# Patient Record
Sex: FEMALE | Race: WHITE | NOT HISPANIC OR LATINO | Employment: FULL TIME
[De-identification: names, ages, dates, MRNs, and addresses within clinical notes are randomized per-mention and may not be internally consistent; named-entity substitution may affect disease eponyms.]

---

## 2017-05-26 ENCOUNTER — HEALTH MAINTENANCE LETTER (OUTPATIENT)
Age: 42
End: 2017-05-26

## 2018-06-02 ENCOUNTER — HEALTH MAINTENANCE LETTER (OUTPATIENT)
Age: 43
End: 2018-06-02

## 2019-09-28 ENCOUNTER — HEALTH MAINTENANCE LETTER (OUTPATIENT)
Age: 44
End: 2019-09-28

## 2021-01-10 ENCOUNTER — HEALTH MAINTENANCE LETTER (OUTPATIENT)
Age: 46
End: 2021-01-10

## 2021-03-13 ENCOUNTER — HEALTH MAINTENANCE LETTER (OUTPATIENT)
Age: 46
End: 2021-03-13

## 2021-10-23 ENCOUNTER — HEALTH MAINTENANCE LETTER (OUTPATIENT)
Age: 46
End: 2021-10-23

## 2022-02-12 ENCOUNTER — HEALTH MAINTENANCE LETTER (OUTPATIENT)
Age: 47
End: 2022-02-12

## 2022-04-09 ENCOUNTER — HEALTH MAINTENANCE LETTER (OUTPATIENT)
Age: 47
End: 2022-04-09

## 2022-10-09 ENCOUNTER — HEALTH MAINTENANCE LETTER (OUTPATIENT)
Age: 47
End: 2022-10-09

## 2023-02-18 ENCOUNTER — HEALTH MAINTENANCE LETTER (OUTPATIENT)
Age: 48
End: 2023-02-18

## 2023-05-21 ENCOUNTER — HEALTH MAINTENANCE LETTER (OUTPATIENT)
Age: 48
End: 2023-05-21

## 2023-08-07 ENCOUNTER — HOSPITAL ENCOUNTER (EMERGENCY)
Facility: HOSPITAL | Age: 48
Discharge: HOME | End: 2023-08-07
Admitting: PHYSICIAN ASSISTANT
Payer: COMMERCIAL

## 2023-08-07 ENCOUNTER — APPOINTMENT (OUTPATIENT)
Dept: RADIOLOGY | Facility: HOSPITAL | Age: 48
End: 2023-08-07
Attending: STUDENT IN AN ORGANIZED HEALTH CARE EDUCATION/TRAINING PROGRAM
Payer: COMMERCIAL

## 2023-08-07 VITALS
HEART RATE: 115 BPM | WEIGHT: 134 LBS | BODY MASS INDEX: 22.88 KG/M2 | SYSTOLIC BLOOD PRESSURE: 137 MMHG | TEMPERATURE: 98.9 F | RESPIRATION RATE: 16 BRPM | DIASTOLIC BLOOD PRESSURE: 81 MMHG | OXYGEN SATURATION: 100 % | HEIGHT: 64 IN
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
## 2023-08-07 DIAGNOSIS — S92.009A CALCANEAL FRACTURE: ICD-10-CM

## 2023-08-07 DIAGNOSIS — M25.571 PAIN IN JOINT, ANKLE AND FOOT, RIGHT: ICD-10-CM

## 2023-08-07 PROCEDURE — 73600 X-RAY EXAM OF ANKLE: CPT | Mod: RT

## 2023-08-07 PROCEDURE — 250N000013 HC RX MED GY IP 250 OP 250 PS 637: Performed by: STUDENT IN AN ORGANIZED HEALTH CARE EDUCATION/TRAINING PROGRAM

## 2023-08-07 PROCEDURE — 28400 CLTX CALCANEAL FX W/O MNPJ: CPT | Mod: RT

## 2023-08-07 PROCEDURE — 99284 EMERGENCY DEPT VISIT MOD MDM: CPT | Mod: 25

## 2023-08-07 PROCEDURE — 73630 X-RAY EXAM OF FOOT: CPT | Mod: RT

## 2023-08-07 RX ORDER — IBUPROFEN 600 MG/1
600 TABLET, FILM COATED ORAL ONCE
Status: COMPLETED | OUTPATIENT
Start: 2023-08-07 | End: 2023-08-07

## 2023-08-07 RX ADMIN — IBUPROFEN 600 MG: 600 TABLET, FILM COATED ORAL at 17:10

## 2023-08-07 NOTE — DISCHARGE INSTRUCTIONS
You were seen here in the emergency department for evaluation of fall, right ankle injury.  Will send home with some crutches.  As we discussed, your calcaneus has some small bone fragments around it.  Unclear if this is acute, or chronic.  Given that you are having some pain in that area, we did put you in a splint, have you follow-up with orthopedics.  Recommended ibuprofen or Tylenol at home.    Call the number for [facility]    For pain or fever you may use:  -Tylenol 650 mg every 6 hours.  Max 4000 mg in 24 hours  Do not use thismedication with alcohol as it can cause liver problems.  -Ibuprofen 600 mg every 6 hours.  Max 3500 mg in 24 hours  Do not take this medication if you have a history of a GI bleed or have kidney problems.  You may use both of these medications at the same time or you can alternate them every 3 hours.  For example, Tylenol at 6 AM, ibuprofen at 9 AM, Tylenol at noon, etc.

## 2023-08-07 NOTE — ED PROVIDER NOTES
EMERGENCY DEPARTMENT ENCOUNTER      NAME: Denise Morales  AGE: 47 year old female  YOB: 1975  MRN: [id number]  EVALUATION DATE & TIME: 8/7/2023  5:35 PM    PCP: No Ref-Primary, Physician    ED PROVIDER: Eliezer Velasquez PA-C      Chief Complaint   Patient presents with     Fall         FINAL IMPRESSION:  1. Fall, initial encounter    2. Pain in joint, ankle and foot, right    3. Calcaneal fracture          ED COURSE & MEDICAL DECISION MAKING:    Pertinent Labs & Imaging studies reviewed. (See chart for details)  5:39 PM I met the patient and performed my initial interview and exam.   5:53 PM Splint in place, plan for discharge.     47 year old female presents to the Emergency Department for evaluation of right ankle pain.    ED Course as of 08/07/23 1807   Mon Aug 07, 2023   1727 XR Ankle Right 2 Views  X-ray of the right foot here shows age-indeterminate avulsive fracture of the anterior calcaneus.  Multiple tiny fracture fragments in this region.  Correlation with point tenderness recommended.  ORIF healed tibia medial malleolus with screw fixation, hardware intact.  Mild soft tissue joint swelling.   1755 Patient seen and reevaluated here, after x-rays.  She does have tenderness over the anterior portion of the calcaneus on the right side.  She had has no other tenderness across the lower extremity.  She is able to move her toes, pulses are intact.  No significant deformity.  Patient inverted the ankle.  She has a previous fracture, with hardware, this was many years ago.  Hardware appears intact here on x-rays.  I did independently review the x-rays here, and do appreciate some of the bony fragments that are present in there.  I will have her follow-up with orthopedics as it is difficult to ascertain with the tenderness if this is just a sprain, or if the avulsion fracture in the right side is new.  She was placed in a posterior splint with a stirrup, and will have her follow-up with  orthopedics.  She is agreeable with this plan.  Recommend ibuprofen or Tylenol at home for pain, she is agreeable with this.  Given number for [facility].  Remainder of examination here is unremarkable, she did not lose consciousness, not on any blood thinners, did not lose consciousness, or hit her head.  Plan for discharge.       Medical Decision Making    History:  Supplemental history from: Documented in chart, if applicable  External Record(s) reviewed: Documented in chart, if applicable.    Work Up:  Chart documentation includes differential considered and any EKGs or imaging independently interpreted by provider, where specified.  In additional to work up documented, I considered the following work up: Documented in chart, if applicable.    External consultation:  Discussion of management with another provider: Documented in chart, if applicable    Complicating factors:  Care impacted by chronic illness: N/A  Care affected by social determinants of health: N/A    Disposition considerations: Discharge. No recommendations on prescription strength medication(s). N/A.       At the conclusion of the encounter I discussed the results of all of the tests and the disposition. The questions were answered. The patient or family acknowledged understanding and was agreeable with the care plan.       0 minutes of critical care time     MEDICATIONS GIVEN IN THE EMERGENCY:  Medications   ibuprofen (ADVIL/MOTRIN) tablet 600 mg (600 mg Oral $Given 8/7/23 2380)       NEW PRESCRIPTIONS STARTED AT TODAY'S ER VISIT  New Prescriptions    No medications on file       =================================================================    HPI    Patient information was obtained from: the patient    Use of : N/A       Denise Morales is a 47 year old female with a pertinent history of cervical intraepithelial neoplasia III and left breast mass who presents to this ED via private vehicle for evaluation of right ankle  "pain.    Patient fell after twisting her right ankle. The patient reports that she heard some \"snaps.\" She has had a right ankle surgery in the past where she had screws put in. Her pain is centralized on her outer ankle and doesn't radiate to any other areas. She has associated right ankle pain and tenderness and reports no other symptoms at this time.    REVIEW OF SYSTEMS   Review of Systems   Musculoskeletal:         Right ankle pain and tenderness   All other systems reviewed and are negative.       PAST MEDICAL HISTORY:  Past Medical History:   Diagnosis Date     Papanicolaou smear of cervix with low grade squamous intraepithelial lesion (LGSIL) 09/08    positive HPV       PAST SURGICAL HISTORY:  Past Surgical History:   Procedure Laterality Date     BREAST BIOPSY, CORE RT/LT Left 08/2016    fibroadenoma     LEEP TX, CERVICAL  10/2008    MÓNICA 1,2, & 3, clear margins     OPEN REDUCTION INTERNAL FIXATION ANKLE  1990    right     CURRENT MEDICATIONS:    Multiple Vitamin (DAILY MULTIVITAMIN PO)       ALLERGIES:  Allergies   Allergen Reactions     Sulfa Antibiotics        FAMILY HISTORY:  Family History   Problem Relation Age of Onset     Gynecology Mother         TVH in mid-40's     Diabetes Mother         type 1     Prostate Cancer Maternal Grandfather      Depression Mother      Depression Father      Substance Abuse Maternal Grandmother      Hyperlipidemia Father         and more on fathers side       SOCIAL HISTORY:   Social History     Socioeconomic History     Marital status:    Tobacco Use     Smoking status: Never   Substance and Sexual Activity     Alcohol use: Yes     Comment: 1x dinner     Drug use: No     Sexual activity: Yes     Partners: Male     Birth control/protection: Surgical     Comment:  has had a vasectomy.       VITALS:  /81   Pulse 115   Temp 98.9  F (37.2  C)   Resp 16   Ht 1.626 m (5' 4\")   Wt 60.8 kg (134 lb)   SpO2 100%   BMI 23.00 kg/m      PHYSICAL EXAM  "   Physical Exam  Vitals and nursing note reviewed.   Constitutional:       General: She is not in acute distress.     Appearance: Normal appearance. She is normal weight. She is not toxic-appearing or diaphoretic.   HENT:      Right Ear: External ear normal.      Left Ear: External ear normal.   Eyes:      Conjunctiva/sclera: Conjunctivae normal.   Cardiovascular:      Rate and Rhythm: Normal rate and regular rhythm.      Pulses: Normal pulses.      Heart sounds: No murmur heard.     No friction rub. No gallop.   Pulmonary:      Effort: Pulmonary effort is normal. No respiratory distress.      Breath sounds: No stridor. No wheezing or rales.   Abdominal:      General: Abdomen is flat.      Palpations: Abdomen is soft.   Musculoskeletal:         General: Swelling, tenderness and signs of injury present. No deformity.      Right lower leg: No edema.      Left lower leg: No edema.      Comments: Patient with tenderness present to the right lateral portion of the ankle, over the calcaneus, which is the area of concern on x-ray.  She does have some range of motion.  Tenderness otherwise to the posterior aspect of the heel, however no significant other abnormalities, no significant deformity.  Patient has not tried to bear weight on it.  Remainder of the right lower extremity is nontender.   Skin:     Findings: No erythema or rash.   Neurological:      Mental Status: She is alert. Mental status is at baseline.         LAB:  All pertinent labs reviewed and interpreted.  Labs Ordered and Resulted from Time of ED Arrival to Time of ED Departure - No data to display    RADIOLOGY:  Reviewed all pertinent imaging. Please see official radiology report.  XR Ankle Right 2 Views   Final Result   IMPRESSION:   1.  Age-indeterminate avulsive fracture of the anterior calcaneus. There are multiple tiny bone fragments in this region. Correlation with point tenderness is recommended.   2.  ORIF healed tibia medial malleolus fracture with  screw fixation. The hardware is intact.   3.  Normal joint spacing and alignment.   4.  Mild soft tissue swelling in the lateral ankle.      Foot  XR, G/E 3 views, right   Final Result   IMPRESSION:   1.  Age-indeterminate avulsive fracture of the anterior calcaneus. There are multiple tiny bone fragments in this region. Correlation with point tenderness is recommended.   2.  ORIF healed tibia medial malleolus fracture with screw fixation. The hardware is intact.   3.  Normal joint spacing and alignment.   4.  Mild soft tissue swelling in the lateral ankle.        PROCEDURES:     PROCEDURE: Splint Placement   INDICATIONS: right avulsion f fracture   PROCEDURE PROVIDER: Gus Velasquez PA-C   NOTE:  A Posterior slab (short leg) with Stirrup splint made of Orthoglass was applied to the  right foot  by the above provider. As noted in the physical exam, distal CMS was intact prior to placement. The splint was checked and the fit was adjusted to ensure proper positioning after placement. Sensation and circulation, as well as motor function, are unchanged after splint placement and the patient is more comfortable with the splint in place.     I, Christopher Rodriguez, am serving as a scribe to document services personally performed by Eliezer Velasquez PA-C, based on my observation and the provider's statements to me. I, Eliezer Velasquez PA-C, attest that Christopher Rodriguez is acting in a scribe capacity, has observed my performance of the services and has documented them in accordance with my direction.    Eliezer Velasquez PA-C  Emergency Medicine  [facility] EMERGENCY DEPARTMENT  [facility] [facility] [address]  [phone]  Dept: [phone]       Eliezer Velasquez PA-C  08/07/23 6297

## 2023-08-07 NOTE — ED TRIAGE NOTES
Patient reports that she fell down 1 step about 45 mins ago at her friends house. Patient report that she has pain in her right ankle.      Triage Assessment       Row Name 08/07/23 1990       Triage Assessment (Adult)    Airway WDL WDL       Respiratory WDL    Respiratory WDL WDL       Skin Circulation/Temperature WDL    Skin Circulation/Temperature WDL WDL       Cardiac WDL    Cardiac WDL WDL       Peripheral/Neurovascular WDL    Peripheral Neurovascular WDL WDL       Cognitive/Neuro/Behavioral WDL    Cognitive/Neuro/Behavioral WDL WDL

## 2023-10-20 ENCOUNTER — OFFICE VISIT (OUTPATIENT)
Dept: FAMILY MEDICINE | Facility: CLINIC | Age: 48
End: 2023-10-20
Payer: COMMERCIAL

## 2023-10-20 VITALS
WEIGHT: 146.4 LBS | SYSTOLIC BLOOD PRESSURE: 124 MMHG | RESPIRATION RATE: 24 BRPM | HEIGHT: 61 IN | OXYGEN SATURATION: 100 % | DIASTOLIC BLOOD PRESSURE: 84 MMHG | HEART RATE: 104 BPM | BODY MASS INDEX: 27.64 KG/M2 | TEMPERATURE: 98.2 F

## 2023-10-20 DIAGNOSIS — M54.42 ACUTE LEFT-SIDED LOW BACK PAIN WITH LEFT-SIDED SCIATICA: Primary | ICD-10-CM

## 2023-10-20 PROCEDURE — 99203 OFFICE O/P NEW LOW 30 MIN: CPT | Performed by: PHYSICIAN ASSISTANT

## 2023-10-20 RX ORDER — CYCLOBENZAPRINE HCL 10 MG
5-10 TABLET ORAL 3 TIMES DAILY PRN
Qty: 30 TABLET | Refills: 0 | Status: SHIPPED | OUTPATIENT
Start: 2023-10-20 | End: 2024-01-10

## 2023-10-20 RX ORDER — PREDNISONE 20 MG/1
TABLET ORAL
Qty: 18 TABLET | Refills: 0 | Status: SHIPPED | OUTPATIENT
Start: 2023-10-20 | End: 2023-11-02

## 2023-10-20 ASSESSMENT — ENCOUNTER SYMPTOMS: LEG PAIN: 1

## 2023-10-20 ASSESSMENT — PAIN SCALES - GENERAL: PAINLEVEL: EXTREME PAIN (8)

## 2023-10-20 NOTE — PATIENT INSTRUCTIONS
Stop ibuprofen when taking prednisone    OK to take tylenol    Flexeril (muscle relaxer) up to 3 times daily if needed - caution sedation     Schedule with PT

## 2023-10-20 NOTE — PROGRESS NOTES
Assessment & Plan     Acute left-sided low back pain with left-sided sciatica  Symptoms consistent with sciatica. Will treat with prednisone taper and flexeril and refer to PT. Discussed not taking ibuprofen while on prednisone, OK to take tylenol. Alternate ice and heat, massage, stretching. Avoid sitting or laying for long periods of time. Declines note for work, employer has been accommodating.   - predniSONE (DELTASONE) 20 MG tablet  Dispense: 18 tablet; Refill: 0  - cyclobenzaprine (FLEXERIL) 10 MG tablet  Dispense: 30 tablet; Refill: 0  - Physical Therapy Referral    Follow up if symptoms worsen or fail to improve     AXEL Keb[facility] SHELLEY Walker is a 47 year old, presenting for the following health issues:  Leg Pain (Left leg/)        10/20/2023    12:38 PM   Additional Questions   Roomed by Cammie QUIJANO       History of Present Illness       Back Pain:  She presents for follow up of back pain. Patient's back pain is a new problem.    Original cause of back pain: not sure  First noticed back pain: 1-4 weeks ago  Patient feels back pain: constantlyLocation of back pain:  Left lower back  Description of back pain: burning, cramping, sharp and shooting  Back pain spreads: left thigh    Since patient first noticed back pain, pain is: unchanged  Does back pain interfere with her job:  Yes  On a scale of 1-10 (10 being the worst), patient describes pain as:  8  What makes back pain worse: bending, coughing, certain positions, sitting and twisting   Acupuncture: not tried  Acetaminophen: helpful  Activity or exercise: not tried  Chiropractor:  Not tried  Cold: helpful  Heat: helpful  Massage: not tried  Muscle relaxants: not tried  NSAIDS: helpful  Opioids: not tried  Physical Therapy: not tried  Rest: helpful  Steroid Injection: not tried  Stretching: helpful  Surgery: not tried  TENS unit: not tried  Topical pain relievers: not tried  Other healthcare providers  "patient is seeing for back pain: None    She eats 2-3 servings of fruits and vegetables daily.She consumes 1 sweetened beverage(s) daily.She exercises with enough effort to increase her heart rate 10 to 19 minutes per day.  She exercises with enough effort to increase her heart rate 3 or less days per week.   She is taking medications regularly.     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 Wonders if compensating on left side contributes to current symptoms     Review of Systems   Constitutional, HEENT, cardiovascular, pulmonary, gi and gu systems are negative, except as otherwise noted.      Objective    /84 (BP Location: Left arm, Patient Position: Sitting, Cuff Size: Adult Regular)   Pulse 104   Temp 98.2  F (36.8  C) (Tympanic)   Resp 24   Ht 1.549 m (5' 1\")   Wt 66.4 kg (146 lb 6.4 oz)   LMP 10/15/2023 (Approximate)   SpO2 100%   BMI 27.66 kg/m    Body mass index is 27.66 kg/m .  Physical Exam   GENERAL: healthy, alert and no distress  NEURO: Normal strength and tone, mentation intact and speech normal  Comprehensive back pain exam:  Tenderness of left lower back, lumbar paraspinal region and left sciatic notch , Pain limits the following motions: flexion/extension of lumbar spine, Lower extremity strength functional and equal on both sides,  , and Straight leg positive on  left, indicating possible ipsilateral radiculopathy  PSYCH: mentation appears normal, affect normal/bright          "

## 2023-12-06 ENCOUNTER — THERAPY VISIT (OUTPATIENT)
Dept: PHYSICAL THERAPY | Facility: REHABILITATION | Age: 48
End: 2023-12-06
Attending: PHYSICIAN ASSISTANT
Payer: COMMERCIAL

## 2023-12-06 DIAGNOSIS — M54.42 ACUTE LEFT-SIDED LOW BACK PAIN WITH LEFT-SIDED SCIATICA: ICD-10-CM

## 2023-12-06 DIAGNOSIS — R29.898 LEFT LEG WEAKNESS: Primary | ICD-10-CM

## 2023-12-06 PROCEDURE — 97161 PT EVAL LOW COMPLEX 20 MIN: CPT | Mod: GP | Performed by: PHYSICAL THERAPIST

## 2023-12-06 PROCEDURE — 97110 THERAPEUTIC EXERCISES: CPT | Mod: GP | Performed by: PHYSICAL THERAPIST

## 2023-12-06 NOTE — PROGRESS NOTES
PHYSICAL THERAPY EVALUATION  Type of Visit: Evaluation    See electronic medical record for Abuse and Falls Screening details.    Subjective   Pt is a 47 year-old woman with chief complaint acute low back and left leg pain. She was given prednisone burst and Flexeril - it did help but not alleviate completely. She broke her foot in August and wore a boot for some time. Her back/leg pain came up suddenly without specific onset. At one point the left leg was more numb, now it is only on the inner left lower leg. Sitting is the worst.       Presenting condition or subjective complaint: lower back pain  Date of onset: 10/20/23 (date of order)    Relevant medical history:     Dates & types of surgery: screw placed in ankle approx. 1990    Prior diagnostic imaging/testing results:       Prior therapy history for the same diagnosis, illness or injury: No      Prior Level of Function  Transfers: Independent  Ambulation: Independent  ADL: Independent      Living Environment  Social support: With a significant other or spouse   Type of home: House   Stairs to enter the home: Yes 4 Is there a railing: No   Ramp: No   Stairs inside the home: Yes 30 Is there a railing: Yes   Help at home: None  Equipment owned:       Employment: Yes teacher  Hobbies/Interests: cooking/baking, gardening, exercising    Patient goals for therapy: resume exercising, sit in chairs comfortably, general resuming of daily activities    Pain assessment: Pain present  Location: left low back, can get into anterior left thigh and lateral thigh and into the calf at times /Rating: today: 5/10     Objective   LUMBAR SPINE EVALUATION    INTEGUMENTARY (edema, incisions): WNL  POSTURE: slight left lateral shift in standing, but iliac crests aligned  GAIT:   Weightbearing Status: WBAT  Assistive Device(s): None  Gait Deviations: increased COM lateral displacement    ROM:   (Degrees) Left AROM Left PROM  Right AROM Right PROM   Hip Flexion WNL  WNL    Hip  Extension WNL  WNL    Hip Abduction       Hip Adduction       Hip Internal Rotation WNLWNL  WNL    Hip External Rotation WNL  WNLWNL    Knee Flexion WNL  WNL    Knee Extension WNL  WNL    Lumbar Side glide WNL WNL   Lumbar Flexion Limited by 25% with increased left low back pain   Lumbar Extension Limited by 10% without pain   Lumbar side bending left/right WNL    PELVIC/SI SCREEN: sacral thrust negative  STRENGTH: 30 sec STS: 10 reps, increased left low back pain    B hip ext: 4+/5        MYOTOMES:    Left Right   T12-L3 (Hip Flexion) 3+ 5   L2-4 (Quads)  5 5   L4 (Ankle DF) 5 5   L5 (Great Toe Ext) 5 5   S1 (Toe Raise) 3+ 5       DERMATOMES:    Left Right   T12      L1     L2     L3     L4 Hypo (light touch)    L5     S1         LUMBAR/HIP Special Tests:    Left Right   ALEXIS Negative  Negative    FADIR/Labrum/RADHA     Femoral Nerve     Josie's     Piriformis     Quadrant Testing     SLR Positive Positive   Slump Positive    Stork with Extension     Sudhir Piper's: positive on left side for tightness felt, but not restricted in overall motion      SPINAL SEGMENTAL CONCLUSIONS: WNL, but tender at L3 and L5 with ventral glides      Assessment & Plan   CLINICAL IMPRESSIONS  Medical Diagnosis: Acute left-sided low back pain with left-sided sciatica    Treatment Diagnosis: left leg weakness   Impression/Assessment: Pt is a 47 year-old woman with chief complaint acute low back and left leg pain. She demonstrates symptoms consistent with L LE radiculopathy with positive slump and B SLR, L4 dermatome affected and weakness into L hip flexors and L ankle plantar flexors. She has difficulty with normal activities (e.g., walking the dog) and pain with sitting, though does better with lumbar extension repetitions. She is appropriate for skilled PT to address impairments, instruct in HEP to return to PLOF.     Clinical Decision Making (Complexity):  Clinical Presentation: Stable/Uncomplicated  Clinical Presentation  Rationale: based on medical and personal factors listed in PT evaluation  Clinical Decision Making (Complexity): Low complexity    PLAN OF CARE  Treatment Interventions:  Interventions: Gait Training, Manual Therapy, Neuromuscular Re-education, Therapeutic Activity, Therapeutic Exercise, Self-Care/Home Management    Long Term Goals     PT Goal 1  Goal Identifier: Pain  Goal Description: Pt will report decreased pain from 5/10 to <2/10 to improve quality of life  Target Date: 02/14/24  PT Goal 2  Goal Identifier: Activity level  Goal Description: Pt will report ability to tolerate activities after work (e.g., walking the dogs), rather than sitting d/t pain/fatigue.  Target Date: 02/14/24      Frequency of Treatment: 1x/week to every other week  Duration of Treatment: 3-4 visits        Risks and benefits of evaluation/treatment have been explained.   Patient/Family/caregiver agrees with Plan of Care.     Evaluation Time:     PT Eval, Low Complexity Minutes (93213): 16       Signing Clinician: Radha Ashley PT

## 2023-12-15 ENCOUNTER — THERAPY VISIT (OUTPATIENT)
Dept: PHYSICAL THERAPY | Facility: REHABILITATION | Age: 48
End: 2023-12-15
Attending: PHYSICIAN ASSISTANT
Payer: COMMERCIAL

## 2023-12-15 DIAGNOSIS — R29.898 LEFT LEG WEAKNESS: Primary | ICD-10-CM

## 2023-12-15 PROCEDURE — 97140 MANUAL THERAPY 1/> REGIONS: CPT | Mod: GP | Performed by: PHYSICAL THERAPIST

## 2023-12-15 PROCEDURE — 97110 THERAPEUTIC EXERCISES: CPT | Mod: GP | Performed by: PHYSICAL THERAPIST

## 2023-12-22 ENCOUNTER — THERAPY VISIT (OUTPATIENT)
Dept: PHYSICAL THERAPY | Facility: REHABILITATION | Age: 48
End: 2023-12-22
Attending: PHYSICIAN ASSISTANT
Payer: COMMERCIAL

## 2023-12-22 DIAGNOSIS — R29.898 LEFT LEG WEAKNESS: Primary | ICD-10-CM

## 2023-12-22 PROCEDURE — 97110 THERAPEUTIC EXERCISES: CPT | Mod: GP | Performed by: PHYSICAL THERAPIST

## 2023-12-22 PROCEDURE — 97140 MANUAL THERAPY 1/> REGIONS: CPT | Mod: GP | Performed by: PHYSICAL THERAPIST

## 2023-12-22 NOTE — PROGRESS NOTES
DISCHARGE  Reason for Discharge: Pt progressing on goals, will follow up with referring provider for additional options (see below)    Equipment Issued: N/A    Discharge Plan: Patient to continue home program.    Referring Provider:  Annabelle Almanza       12/22/23 0500   Appointment Info   Signing clinician's name / credentials Radha Ashley, PT   Total/Authorized Visits 3-4   Visits Used 3   Medical Diagnosis Acute left-sided low back pain with left-sided sciatica   PT Tx Diagnosis left leg weakness   Progress Note/Certification   Onset of illness/injury or Date of Surgery 10/20/23  (date of order)   Therapy Frequency 1x/week to every other week   Predicted Duration 3-4 visits   Progress Note Due Date   (visit 4)   GOALS   PT Goals 2   PT Goal 1   Goal Identifier Pain   Goal Description Pt will report decreased pain from 5/10 to <2/10 to improve quality of life   Goal Progress Goal progressing, this week pain was typically 3/10   Target Date 02/14/24   PT Goal 2   Goal Identifier Activity level   Goal Description Pt will report ability to tolerate activities after work (e.g., walking the dogs), rather than sitting d/t pain/fatigue.   Goal Progress goal progressing, she is slowly able to perform more activities.   Target Date 02/14/24   Subjective Report   Subjective Report Pt reports that things were better this week, but she sat for 4 hours in a car today and this made it worse. Currently rating pain 3/10 and at worst was 5/10 while sitting. The numbness is decreased - smaller area and less intense. When sitting for too long will have tingling into the left foot, but this goes away.   Objective Measures   Objective Measures Objective Measure 1   Objective Measure 1   Objective Measure Strength   Details L ankle PF 4/5, left hip flexor: 4-/5   Treatment Interventions (PT)   Interventions Therapeutic Procedure/Exercise;Manual Therapy   Therapeutic Procedure/Exercise   Therapeutic Procedures: strength, endurance,  ROM, flexibillity minutes (66248) 36   Ther Proc 1 Exercises for core/hip and ankle strengthening/ROM   Ther Proc 1 - Details Nustep warm up level 7 x 7 minutes, wall squats with TA x 20, pallof press L3 x 20 each side, trunk rotation with L3 band x 20, progression of TA from marches with brief trial of up/up/down/down (painful), then with single leg extension (and arms overhead) x 20.   Skilled Intervention Pt given verbal, visual and tactile cueing with demonstration to perform correct technique and appropriate alignment for exercises attempted. Provided rationale for exercises performed and educated on the importance of completing them as instructed.   Patient Response/Progress Pain only with up/up/down/down when both legs lifted at same time   Manual Therapy   Manual Therapy: Mobilization, MFR, MLD, friction massage minutes (05880) 10   Manual Therapy 1 - Details Manual therapy to decrease pain   Skilled Intervention L sided axial separation to reduce pain/leg symptoms: pt in R sidelying with left hip/knee flexed and rotation introduced at upper trunk, force applied at greater trochanter.   Patient Response/Progress Less pain at end of session   Plan   Home program See PTRx   Plan for next session N/A   Comments   Comments Pt has been seen for 3 visits from 12/6/23 to present with treatment focused on therapeutic exercise for lumbar ROM (with focus on repeated lumbar ext), core/hip/LE strengthening and manual therapy to decrease pain. She reports 30% improvement overall and less numbness into the left leg. Lumbar extension has remained beneficial and pt has been able to tolerate a progression of core strengthening over time. Encouraged pt to follow up with referring provider for next steps - she may benefit from an MRI and spine or ortho referral for additional options (e.g., injection).   Total Session Time   Timed Code Treatment Minutes 46   Total Treatment Time (sum of timed and untimed services) 46        Radha Ashley, PT, DPT, CLT  12/22/2023

## 2024-01-10 ENCOUNTER — VIRTUAL VISIT (OUTPATIENT)
Dept: FAMILY MEDICINE | Facility: CLINIC | Age: 49
End: 2024-01-10
Payer: COMMERCIAL

## 2024-01-10 DIAGNOSIS — N94.9 ADNEXAL CYST: ICD-10-CM

## 2024-01-10 DIAGNOSIS — G89.29 CHRONIC LEFT-SIDED LOW BACK PAIN WITH LEFT-SIDED SCIATICA: Primary | ICD-10-CM

## 2024-01-10 DIAGNOSIS — M54.42 CHRONIC LEFT-SIDED LOW BACK PAIN WITH LEFT-SIDED SCIATICA: Primary | ICD-10-CM

## 2024-01-10 PROCEDURE — 99213 OFFICE O/P EST LOW 20 MIN: CPT | Mod: 95 | Performed by: PHYSICIAN ASSISTANT

## 2024-01-10 RX ORDER — GABAPENTIN 100 MG/1
100 CAPSULE ORAL 3 TIMES DAILY
Qty: 90 CAPSULE | Refills: 1 | Status: SHIPPED | OUTPATIENT
Start: 2024-01-10 | End: 2024-05-09

## 2024-01-10 RX ORDER — NAPROXEN 500 MG/1
500 TABLET ORAL 2 TIMES DAILY WITH MEALS
Qty: 60 TABLET | Refills: 1 | Status: SHIPPED | OUTPATIENT
Start: 2024-01-10 | End: 2024-05-09

## 2024-01-10 NOTE — PATIENT INSTRUCTIONS
To schedule your imaging (MRI): Call [phone]     Stop advil     Start naproxen 500 mg twice daily with food     You can take tylenol up to 1,000 mg 4 times daily if needed in between naproxen doses    Start gabapentin (for nerve pain), 100 mg 3 times daily if needed - caution sedation

## 2024-01-10 NOTE — PROGRESS NOTES
Denise is a 48 year old who is being evaluated via a billable video visit.      How would you like to obtain your AVS? MyChart  If the video visit is dropped, the invitation should be resent by: Text to cell phone: [phone]  Will anyone else be joining your video visit? No      Assessment & Plan     Chronic left-sided low back pain with left-sided sciatica  Ongoing symptoms for 3+ months with minimal improvement after PT. Discussed getting MRI for further evaluation. Stop ibuprofen, switch to naproxen. OK to use tylenol in between. Trial of gabapentin - caution sedation. Likely refer to ortho once MRI results available.  - MR Lumbar Spine w/o Contrast  - naproxen (NAPROSYN) 500 MG tablet  Dispense: 60 tablet; Refill: 1  - gabapentin (NEURONTIN) 100 MG capsule  Dispense: 90 capsule; Refill: 1    Follow up as needed     Annabelle Almanza PA-C on 1/10/2024 at 5:03 PM    [facility]   Denise is a 48 year old, presenting for the following health issues:  Follow Up      History of Present Illness       Back Pain:  She presents for follow up of back pain. Patient's back pain is a chronic problem.  Location of back pain:  Left lower back  Description of back pain: dull ache, fullness, gnawing and shooting  Back pain spreads: left thigh    Since patient first noticed back pain, pain is: always present, but gets better and worse  Does back pain interfere with her job:  Yes       She eats 4 or more servings of fruits and vegetables daily.She consumes 2 sweetened beverage(s) daily.She exercises with enough effort to increase her heart rate 9 or less minutes per day.  She exercises with enough effort to increase her heart rate 3 or less days per week.      Seen  for symptoms consistent with sciatica of left side  She was treated with prednisone burst and flexeril and referred to PT  She completed 3 sessions of PT with minimal improvement  Numbness and weakness are better  and mobility has improved some  However she continues to have significant issues with pain and radiculopathy   PT has suggested MRI and possible ortho referral for consideration of injections     Review of Systems   Constitutional, HEENT, cardiovascular, pulmonary, gi and gu systems are negative, except as otherwise noted.      Objective       Vitals:  No vitals were obtained today due to virtual visit.    Physical Exam   GENERAL: Healthy, alert and no distress  EYES: Eyes grossly normal to inspection.  No discharge or erythema, or obvious scleral/conjunctival abnormalities.  RESP: No audible wheeze, cough, or visible cyanosis.  No visible retractions or increased work of breathing.    SKIN: Visible skin clear. No significant rash, abnormal pigmentation or lesions.  NEURO: Cranial nerves grossly intact.  Mentation and speech appropriate for age.  PSYCH: Mentation appears normal, affect normal/bright, judgement and insight intact, normal speech and appearance well-groomed.        Video-Visit Details    Type of service:  Video Visit   Video start time: 5:04 PM   Video end time: 5:19 PM      Originating Location (pt. Location): Home  Distant Location (provider location):  On-site  Platform used for Video Visit: Amber

## 2024-01-12 ENCOUNTER — TELEPHONE (OUTPATIENT)
Dept: FAMILY MEDICINE | Facility: CLINIC | Age: 49
End: 2024-01-12
Payer: COMMERCIAL

## 2024-01-12 NOTE — TELEPHONE ENCOUNTER
Patient states insurance needs order for MRI faxed to [facility]. Phone is [phone].  Insurance did not give fax number.   Patient is scheduled for MRI 2/4/24.  Xenia Yoo RN

## 2024-01-12 NOTE — TELEPHONE ENCOUNTER
Called [facility] for fax number to send MRI order to. MRI order faxed to [phone].    Aicha Fair,  [facility]

## 2024-02-04 ENCOUNTER — ANCILLARY PROCEDURE (OUTPATIENT)
Dept: MRI IMAGING | Facility: CLINIC | Age: 49
End: 2024-02-04
Attending: PHYSICIAN ASSISTANT
Payer: COMMERCIAL

## 2024-02-04 DIAGNOSIS — G89.29 CHRONIC LEFT-SIDED LOW BACK PAIN WITH LEFT-SIDED SCIATICA: ICD-10-CM

## 2024-02-04 DIAGNOSIS — M54.42 CHRONIC LEFT-SIDED LOW BACK PAIN WITH LEFT-SIDED SCIATICA: ICD-10-CM

## 2024-02-04 PROCEDURE — 72148 MRI LUMBAR SPINE W/O DYE: CPT | Mod: GC | Performed by: RADIOLOGY

## 2024-02-05 NOTE — RESULT ENCOUNTER NOTE
Denise,    I have reviewed your results below:    - there is a small bulging disc in your low back, it is not causing any narrowing of your spinal canal or where the nerves come out however. How is your back feeling? I can refer you to orthopedics to see if they have further suggestions on management?  - there was an incidental finding of cysts near the right ovary/fallopian tube and some thickening of the endometrial lining - it is recommended to get a pelvic ultrasound to further evaluate this. I have placed an order for this to be done at [facility] - call [phone] to schedule. They will likely do both a transabdominal as well as transvaginal probe to get these images - just heads up.    Please let me know if you have any further questions.    Take care,  Annabelle Almanza PA-C on 2/5/2024 at 8:17 AM

## 2024-02-13 ENCOUNTER — HOSPITAL ENCOUNTER (OUTPATIENT)
Dept: ULTRASOUND IMAGING | Facility: CLINIC | Age: 49
Discharge: HOME | End: 2024-02-13
Attending: PHYSICIAN ASSISTANT | Admitting: PHYSICIAN ASSISTANT
Payer: COMMERCIAL

## 2024-02-13 DIAGNOSIS — N94.9 ADNEXAL CYST: ICD-10-CM

## 2024-02-13 PROCEDURE — 76856 US EXAM PELVIC COMPLETE: CPT

## 2024-02-13 PROCEDURE — 76830 TRANSVAGINAL US NON-OB: CPT

## 2024-02-14 DIAGNOSIS — N83.201 OVARIAN CYST, RIGHT: Primary | ICD-10-CM

## 2024-02-14 DIAGNOSIS — D27.0 TERATOMA OF RIGHT OVARY: ICD-10-CM

## 2024-02-14 NOTE — RESULT ENCOUNTER NOTE
Denise,    Your pelvic ultrasound shows 2 cysts that need further evaluation with an MRI as their appearance is indeterminate based on the ultrasound. You can call [phone] to schedule this. Sorry for all the back and forth!! Please let me know if you have any further questions otherwise I will reach out once I see the results.     Take care,  Annabelle Almanza PA-C on 2/14/2024 at 8:43 AM

## 2024-02-29 ENCOUNTER — HOSPITAL ENCOUNTER (OUTPATIENT)
Dept: MRI IMAGING | Facility: HOSPITAL | Age: 49
Discharge: HOME | End: 2024-02-29
Attending: PHYSICIAN ASSISTANT | Admitting: PHYSICIAN ASSISTANT
Payer: COMMERCIAL

## 2024-02-29 DIAGNOSIS — N83.201 OVARIAN CYST, RIGHT: ICD-10-CM

## 2024-02-29 PROCEDURE — A9585 GADOBUTROL INJECTION: HCPCS | Performed by: PHYSICIAN ASSISTANT

## 2024-02-29 PROCEDURE — 255N000002 HC RX 255 OP 636: Performed by: PHYSICIAN ASSISTANT

## 2024-02-29 PROCEDURE — 72197 MRI PELVIS W/O & W/DYE: CPT

## 2024-02-29 RX ORDER — GADOBUTROL 604.72 MG/ML
0.1 INJECTION INTRAVENOUS ONCE
Status: COMPLETED | OUTPATIENT
Start: 2024-02-29 | End: 2024-02-29

## 2024-02-29 RX ADMIN — GADOBUTROL 6 ML: 604.72 INJECTION INTRAVENOUS at 09:53

## 2024-03-04 NOTE — RESULT ENCOUNTER NOTE
Denise,    I have reviewed your results. MRI shows a dermoid cyst on your right ovary - these cysts are almost always benign but are often recommended to be removed as they can increase risk of ovarian torsion and other complications. It has been recommended that we get a consult with OBGYN for further discussion of management - I have placed this referral for you. You should receive a phone call to schedule, but if you don't hear from a representative within 2 business days, please call [phone].    Please let me know if you have any further questions.    Take care,  Annabelle Almanza PA-C on 3/4/2024 at 7:10 AM

## 2024-03-14 ENCOUNTER — PATIENT OUTREACH (OUTPATIENT)
Dept: FAMILY MEDICINE | Facility: CLINIC | Age: 49
End: 2024-03-14
Payer: COMMERCIAL

## 2024-03-14 NOTE — TELEPHONE ENCOUNTER
Patient Quality Outreach sent through [employer] and letter to current home address.       Candi Story MA on 3/14/2024 at 3:49 PM

## 2024-03-14 NOTE — LETTER
March 14, 2024      Denise Morales  [address]  [place] [address]        Dear Denise,    I care about your health and have reviewed your health plan. I have reviewed your medical conditions, medication list, and lab results and am making recommendations based on this review, to better manage your health.    You are in particular need of attention regarding:  -Cholesterol  -Diabetes  -Breast Cancer Screening  -Colon Cancer Screening  -Cervical Cancer Screening  -Wellness (Physical) Visit   -Immunizations    I am recommending that you:  -schedule a WELLNESS (Physical) APPOINTMENT with me.   I will check fasting labs the same day - nothing to eat except water and meds for 8-10 hours prior.  -schedule a COLONOSCOPY to look for colon cancer (due every 10 years or 5 years in higher risk situations.)   Colon cancer is now the second leading cause of death in the [place] for both men and women and there are over 130,000 new cases and 50,000 deaths per year from colon cancer.  Colonoscopies can prevent 90-95% of these deaths.  Problem lesions can be removed before they ever become cancer.  This test is not only looking for cancer, but also getting rid of precancerious lesions.  If you do not wish to do a colonoscopy or cannot afford to do one, at this time, there is another option. It is called a FIT test or Fecal Immunochemical Occult Blood Test (take home stool sample kit).  It does not replace the colonoscopy for colorectal cancer screening, but it can detect hidden bleeding in the lower colon.  It does need to be repeated every year and if a positive result is obtained, you would be referred for a colonoscopy.  If you have completed either one of these tests at another facility, please have the records sent to our clinic so that we can best coordinate your care.    Here is a list of Health Maintenance topics that are due now or due soon:  Health Maintenance Due   Topic Date Due    ANNUAL REVIEW OF  HM ORDERS  Never done    Discuss Advance Care Planning  Never done    Colorectal Cancer Screening  Never done    HIV Screening  Never done    Hepatitis C Screening  Never done    Hepatitis B Vaccine (1 of 3 - 19+ 3-dose series) Never done    Diptheria Tetanus Pertussis (DTAP/TDAP/TD) Vaccine (6 - Tdap) 09/12/1998    Cholesterol Lab  Never done    Yearly Preventive Visit  09/09/2016    HPV Screening  09/09/2016    PAP Smear  09/09/2016    Mammogram  08/19/2018    Blood Sugar Screening  09/09/2018       Please call us at [phone] (or use [employer]) to address the above recommendations.     Thank you for trusting [facility] and we appreciate the opportunity to serve you.  We look forward to supporting your healthcare needs in the future.    Healthy Regards,    Annabelle Guzman PA-C

## 2024-03-16 ENCOUNTER — HEALTH MAINTENANCE LETTER (OUTPATIENT)
Age: 49
End: 2024-03-16

## 2024-05-08 ASSESSMENT — ANXIETY QUESTIONNAIRES
3. WORRYING TOO MUCH ABOUT DIFFERENT THINGS: NOT AT ALL
8. IF YOU CHECKED OFF ANY PROBLEMS, HOW DIFFICULT HAVE THESE MADE IT FOR YOU TO DO YOUR WORK, TAKE CARE OF THINGS AT HOME, OR GET ALONG WITH OTHER PEOPLE?: NOT DIFFICULT AT ALL
GAD7 TOTAL SCORE: 0
6. BECOMING EASILY ANNOYED OR IRRITABLE: NOT AT ALL
7. FEELING AFRAID AS IF SOMETHING AWFUL MIGHT HAPPEN: NOT AT ALL
1. FEELING NERVOUS, ANXIOUS, OR ON EDGE: NOT AT ALL
7. FEELING AFRAID AS IF SOMETHING AWFUL MIGHT HAPPEN: NOT AT ALL
GAD7 TOTAL SCORE: 0
5. BEING SO RESTLESS THAT IT IS HARD TO SIT STILL: NOT AT ALL
2. NOT BEING ABLE TO STOP OR CONTROL WORRYING: NOT AT ALL
4. TROUBLE RELAXING: NOT AT ALL
IF YOU CHECKED OFF ANY PROBLEMS ON THIS QUESTIONNAIRE, HOW DIFFICULT HAVE THESE PROBLEMS MADE IT FOR YOU TO DO YOUR WORK, TAKE CARE OF THINGS AT HOME, OR GET ALONG WITH OTHER PEOPLE: NOT DIFFICULT AT ALL

## 2024-05-09 ENCOUNTER — OFFICE VISIT (OUTPATIENT)
Dept: OBGYN | Facility: CLINIC | Age: 49
End: 2024-05-09
Payer: COMMERCIAL

## 2024-05-09 VITALS
HEART RATE: 88 BPM | OXYGEN SATURATION: 99 % | BODY MASS INDEX: 25.49 KG/M2 | SYSTOLIC BLOOD PRESSURE: 136 MMHG | HEIGHT: 61 IN | DIASTOLIC BLOOD PRESSURE: 86 MMHG | WEIGHT: 135 LBS

## 2024-05-09 DIAGNOSIS — D27.0 DERMOID CYST OF RIGHT OVARY: ICD-10-CM

## 2024-05-09 PROCEDURE — 99203 OFFICE O/P NEW LOW 30 MIN: CPT | Performed by: OBSTETRICS & GYNECOLOGY

## 2024-05-09 RX ORDER — BIOTIN 10000 MCG
CAPSULE ORAL
COMMUNITY

## 2024-05-09 NOTE — PROGRESS NOTES
CC: Denise Morales is here secondary to a dermoid cyst.    HPI: The pt is a 48 year old MWF  who presents with a dermoid seen on radiologic studies.  She initially had an MRI for back pain on 2/4/24.  Incidentally, a cystic structure was seen in her right adnexa.  Ultrasound was done on 2/13/24 that identified 2 cystic structures but couldn't fully define them.  Pelvic MRI was then done on 2/29/24.  It showed a 3.9 x 3 x 3.3 cm lesion with a few thin internal septations and fat in the right ovary.  The uterus and left ovary appeared normal.  She is not symptomatic from this.  She has not had other pelvic imaging aside from her pregnancies.    Past Medical History:   Diagnosis Date    Papanicolaou smear of cervix with low grade squamous intraepithelial lesion (LGSIL) 09/08    positive HPV       Past Surgical History:   Procedure Laterality Date    BREAST BIOPSY, CORE RT/LT Left 08/2016    fibroadenoma    LEEP TX, CERVICAL  10/2008    MÓNICA 1,2, & 3, clear margins    OPEN REDUCTION INTERNAL FIXATION ANKLE  1990    right       Patient's   Family History   Problem Relation Age of Onset    Gynecology Mother         TVH in mid-40's    Diabetes Mother         type 1    Depression Mother     Depression Father     Hyperlipidemia Father         and more on fathers side    Substance Abuse Maternal Grandmother     Prostate Cancer Maternal Grandfather     No Known Problems Daughter     No Known Problems Daughter        Patient   Social History     Socioeconomic History    Marital status:      Spouse name: None    Number of children: None    Years of education: None    Highest education level: None   Tobacco Use    Smoking status: Never     Passive exposure: Never    Smokeless tobacco: Never   Vaping Use    Vaping status: Never Used   Substance and Sexual Activity    Alcohol use: Yes     Comment: 1x dinner    Drug use: No    Sexual activity: Yes     Partners: Male     Birth control/protection: Surgical     Comment:   has had a vasectomy.     Social Determinants of Health     Financial Resource Strain: Low Risk  (1/10/2024)    Financial Resource Strain     Within the past 12 months, have you or your family members you live with been unable to get utilities (heat, electricity) when it was really needed?: No   Food Insecurity: Low Risk  (1/10/2024)    Food Insecurity     Within the past 12 months, did you worry that your food would run out before you got money to buy more?: No     Within the past 12 months, did the food you bought just not last and you didn t have money to get more?: No   Transportation Needs: Low Risk  (1/10/2024)    Transportation Needs     Within the past 12 months, has lack of transportation kept you from medical appointments, getting your medicines, non-medical meetings or appointments, work, or from getting things that you need?: No   Interpersonal Safety: Low Risk  (10/20/2023)    Interpersonal Safety     Do you feel physically and emotionally safe where you currently live?: Yes     Within the past 12 months, have you been hit, slapped, kicked or otherwise physically hurt by someone?: No     Within the past 12 months, have you been humiliated or emotionally abused in other ways by your partner or ex-partner?: No   Housing Stability: Low Risk  (1/10/2024)    Housing Stability     Do you have housing? : Yes     Are you worried about losing your housing?: No       Outpatient Medications Prior to Visit   Medication Sig Dispense Refill    Biotin 10 MG CAPS       Collagen Hydrolysate, Bovine, POWD       Multiple Vitamin (DAILY MULTIVITAMIN PO)       gabapentin (NEURONTIN) 100 MG capsule Take 1 capsule (100 mg) by mouth 3 times daily 90 capsule 1    naproxen (NAPROSYN) 500 MG tablet Take 1 tablet (500 mg) by mouth 2 times daily (with meals) 60 tablet 1     No facility-administered medications prior to visit.       Patient is allergic to sulfa antibiotics.    ROS:  12 part ROS is negative aside from those  "symptoms in the HPI    PE:  /86 (BP Location: Right arm, Patient Position: Sitting, Cuff Size: Adult Regular)   Pulse 88   Ht 1.549 m (5' 1\")   Wt 61.2 kg (135 lb)   LMP 04/22/2024 (Approximate)           Body mass index is 25.51 kg/m .    General: WN/WD WF, NAD  Psych: normal mood  Neuro: CN I-XII grossly intact  MS: normal gait    Assessment: 48 year old MWF  with a right dermoid cyst.    Plan: Natural history of ovarian cysts discussed with the patient.  We discussed that since she is asymptomatic and the dermoid is <5 cm, she doesn't need to do anything now about it.  I did recommend a follow up ultrasound in a year to see if it's growing or not.  We discussed symptoms of torsion.  If it remains <5 cm she doesn't need to have surgery unless it becomes symptomatic.  Questions were answered to the best of my ability.      Answers submitted by the patient for this visit:  PETER-7 (Submitted on 5/8/2024)  PETER 7 TOTAL SCORE: 0    "

## 2024-06-19 ENCOUNTER — TELEPHONE (OUTPATIENT)
Dept: FAMILY MEDICINE | Facility: CLINIC | Age: 49
End: 2024-06-19
Payer: COMMERCIAL

## 2024-06-19 NOTE — TELEPHONE ENCOUNTER
Patient Quality Outreach    Patient is due for the following:   Diabetes -  A1C and LDL (Fasting)  Colon Cancer Screening  Breast Cancer Screening - Mammogram  Cervical Cancer Screening - PAP Needed  Physical Preventive Adult Physical      Topic Date Due    Hepatitis B Vaccine (1 of 3 - 19+ 3-dose series) Never done    Diptheria Tetanus Pertussis (DTAP/TDAP/TD) Vaccine (6 - Tdap) 09/12/1998       Next Steps:   Schedule a Adult Preventative    Type of outreach:    Sent [employer] message. and Sent letter.      Questions for provider review:    None           Candi Story MA

## 2024-06-19 NOTE — LETTER
June 19, 2024      Denise Morales  [address]  [place] [address]        Dear Denise,    I care about your health and have reviewed your health plan. I have reviewed your medical conditions, medication list, and lab results and am making recommendations based on this review, to better manage your health.    You are in particular need of attention regarding:  -Cholesterol  -Breast Cancer Screening  -Cervical Cancer Screening  -Wellness (Physical) Visit   -Immunization    I am recommending that you:  -schedule a WELLNESS (Physical) APPOINTMENT with me.   I will check fasting labs the same day - nothing to eat except water and meds for 8-10 hours prior.    Here is a list of Health Maintenance topics that are due now or due soon:  Health Maintenance Due   Topic Date Due    ANNUAL REVIEW OF HM ORDERS  Never done    Discuss Advance Care Planning  Never done    Colorectal Cancer Screening  Never done    HIV Screening  Never done    Hepatitis C Screening  Never done    Hepatitis B Vaccine (1 of 3 - 19+ 3-dose series) Never done    Diptheria Tetanus Pertussis (DTAP/TDAP/TD) Vaccine (6 - Tdap) 09/12/1998    Cholesterol Lab  Never done    Yearly Preventive Visit  09/09/2016    HPV Screening  09/09/2016    PAP Smear  09/09/2016    Mammogram  08/19/2018    Blood Sugar Screening  09/09/2018       Please call us at [phone] (or use "[employer] Simple[employer] Fast and Elegant Tablet [employer] App") to address the above recommendations.     Thank you for trusting [facility] and we appreciate the opportunity to serve you.  We look forward to supporting your healthcare needs in the future.    Healthy Regards,    Annabelle Guzman PA-C

## 2024-09-19 SDOH — HEALTH STABILITY: PHYSICAL HEALTH: ON AVERAGE, HOW MANY DAYS PER WEEK DO YOU ENGAGE IN MODERATE TO STRENUOUS EXERCISE (LIKE A BRISK WALK)?: 5 DAYS

## 2024-09-20 ENCOUNTER — OFFICE VISIT (OUTPATIENT)
Dept: FAMILY MEDICINE | Facility: CLINIC | Age: 49
End: 2024-09-20
Payer: COMMERCIAL

## 2024-09-20 VITALS
OXYGEN SATURATION: 100 % | TEMPERATURE: 97.9 F | DIASTOLIC BLOOD PRESSURE: 80 MMHG | HEART RATE: 115 BPM | WEIGHT: 136 LBS | RESPIRATION RATE: 16 BRPM | HEIGHT: 61 IN | SYSTOLIC BLOOD PRESSURE: 138 MMHG | BODY MASS INDEX: 25.68 KG/M2

## 2024-09-20 DIAGNOSIS — Z13.220 LIPID SCREENING: ICD-10-CM

## 2024-09-20 DIAGNOSIS — Z23 NEED FOR COVID-19 VACCINE: ICD-10-CM

## 2024-09-20 DIAGNOSIS — Z23 NEED FOR DIPHTHERIA-TETANUS-PERTUSSIS (TDAP) VACCINE: ICD-10-CM

## 2024-09-20 DIAGNOSIS — Z12.11 SCREEN FOR COLON CANCER: ICD-10-CM

## 2024-09-20 DIAGNOSIS — Z12.31 VISIT FOR SCREENING MAMMOGRAM: ICD-10-CM

## 2024-09-20 DIAGNOSIS — Z00.00 ROUTINE GENERAL MEDICAL EXAMINATION AT A HEALTH CARE FACILITY: Primary | ICD-10-CM

## 2024-09-20 DIAGNOSIS — Z23 NEEDS FLU SHOT: ICD-10-CM

## 2024-09-20 DIAGNOSIS — Z12.4 CERVICAL CANCER SCREENING: ICD-10-CM

## 2024-09-20 PROCEDURE — 90471 IMMUNIZATION ADMIN: CPT | Performed by: PHYSICIAN ASSISTANT

## 2024-09-20 PROCEDURE — G0145 SCR C/V CYTO,THINLAYER,RESCR: HCPCS | Performed by: PHYSICIAN ASSISTANT

## 2024-09-20 PROCEDURE — 91320 SARSCV2 VAC 30MCG TRS-SUC IM: CPT | Performed by: PHYSICIAN ASSISTANT

## 2024-09-20 PROCEDURE — 90715 TDAP VACCINE 7 YRS/> IM: CPT | Performed by: PHYSICIAN ASSISTANT

## 2024-09-20 PROCEDURE — 90472 IMMUNIZATION ADMIN EACH ADD: CPT | Performed by: PHYSICIAN ASSISTANT

## 2024-09-20 PROCEDURE — 90480 ADMN SARSCOV2 VAC 1/ONLY CMP: CPT | Performed by: PHYSICIAN ASSISTANT

## 2024-09-20 PROCEDURE — 90656 IIV3 VACC NO PRSV 0.5 ML IM: CPT | Performed by: PHYSICIAN ASSISTANT

## 2024-09-20 PROCEDURE — 87624 HPV HI-RISK TYP POOLED RSLT: CPT | Performed by: PHYSICIAN ASSISTANT

## 2024-09-20 PROCEDURE — 99396 PREV VISIT EST AGE 40-64: CPT | Mod: 25 | Performed by: PHYSICIAN ASSISTANT

## 2024-09-20 ASSESSMENT — PAIN SCALES - GENERAL: PAINLEVEL: NO PAIN (0)

## 2024-09-20 NOTE — PATIENT INSTRUCTIONS
Patient Education   Preventive Care Advice   This is general advice given by our system to help you stay healthy. However, your care team may have specific advice just for you. Please talk to your care team about your preventive care needs.  Nutrition  Eat 5 or more servings of fruits and vegetables each day.  Try wheat bread, brown rice and whole grain pasta (instead of white bread, rice, and pasta).  Get enough calcium and vitamin D. Check the label on foods and aim for 100% of the RDA (recommended daily allowance).  Lifestyle  Exercise at least 150 minutes each week  (30 minutes a day, 5 days a week).  Do muscle strengthening activities 2 days a week. These help control your weight and prevent disease.  No smoking.  Wear sunscreen to prevent skin cancer.  Have a dental exam and cleaning every 6 months.  Yearly exams  See your health care team every year to talk about:  Any changes in your health.  Any medicines your care team has prescribed.  Preventive care, family planning, and ways to prevent chronic diseases.  Shots (vaccines)   HPV shots (up to age 26), if you've never had them before.  Hepatitis B shots (up to age 59), if you've never had them before.  COVID-19 shot: Get this shot when it's due.  Flu shot: Get a flu shot every year.  Tetanus shot: Get a tetanus shot every 10 years.  Pneumococcal, hepatitis A, and RSV shots: Ask your care team if you need these based on your risk.  Shingles shot (for age 50 and up)  General health tests  Diabetes screening:  Starting at age 35, Get screened for diabetes at least every 3 years.  If you are younger than age 35, ask your care team if you should be screened for diabetes.  Cholesterol test: At age 39, start having a cholesterol test every 5 years, or more often if advised.  Bone density scan (DEXA): At age 50, ask your care team if you should have this scan for osteoporosis (brittle bones).  Hepatitis C: Get tested at least once in your life.  STIs (sexually  transmitted infections)  Before age 24: Ask your care team if you should be screened for STIs.  After age 24: Get screened for STIs if you're at risk. You are at risk for STIs (including HIV) if:  You are sexually active with more than one person.  You don't use condoms every time.  You or a partner was diagnosed with a sexually transmitted infection.  If you are at risk for HIV, ask about PrEP medicine to prevent HIV.  Get tested for HIV at least once in your life, whether you are at risk for HIV or not.  Cancer screening tests  Cervical cancer screening: If you have a cervix, begin getting regular cervical cancer screening tests starting at age 21.  Breast cancer scan (mammogram): If you've ever had breasts, begin having regular mammograms starting at age 40. This is a scan to check for breast cancer.  Colon cancer screening: It is important to start screening for colon cancer at age 45.  Have a colonoscopy test every 10 years (or more often if you're at risk) Or, ask your provider about stool tests like a FIT test every year or Cologuard test every 3 years.  To learn more about your testing options, visit:   .  For help making a decision, visit:   https://bit.ly/sp04471.  Prostate cancer screening test: If you have a prostate, ask your care team if a prostate cancer screening test (PSA) at age 55 is right for you.  Lung cancer screening: If you are a current or former smoker ages 50 to 80, ask your care team if ongoing lung cancer screenings are right for you.  For informational purposes only. Not to replace the advice of your health care provider. Copyright   2023 [employer]. All rights reserved. Clinically reviewed by the [facility] Transitions Program. [employer] 616247 - REV 01/24.

## 2024-09-20 NOTE — PROGRESS NOTES
"Preventive Care Visit  [facility]  Annabelle Guzman PA-C, Physician Assistant - Medical  Sep 20, 2024    Assessment & Plan     Routine general medical examination at a health care facility  Will return for lab only when fasting per pt preference.  - Lipid panel reflex to direct LDL Fasting  - Comprehensive metabolic panel (BMP + Alb, Alk Phos, ALT, AST, Total. Bili, TP)  - CBC with platelets    Screen for colon cancer  - Colonoscopy Screening  Referral    Cervical cancer screening  - HPV and Gynecologic Cytology Panel - Recommended Age 30 - 65 Years    Visit for screening mammogram  - MA Screening Bilateral w/ [place]    Lipid screening  - Lipid panel reflex to direct LDL Fasting    Need for diphtheria-tetanus-pertussis (Tdap) vaccine  - TDAP 10-64Y (ADACEL,BOOSTRIX)    Needs flu shot  - INFLUENZA VACCINE,SPLIT VIRUS,TRIVALENT,PF(FLUZONE)    Need for COVID-19 vaccine  - COVID-19 12+ (PFIZER)      BMI  Estimated body mass index is 26.01 kg/m  as calculated from the following:    Height as of this encounter: 1.54 m (5' 0.63\").    Weight as of this encounter: 61.7 kg (136 lb).       Counseling  Appropriate preventive services were addressed with this patient via screening, questionnaire, or discussion as appropriate for fall prevention, nutrition, physical activity, Tobacco-use cessation, social engagement, weight loss and cognition.  Checklist reviewing preventive services available has been given to the patient.  Reviewed patient's diet, addressing concerns and/or questions.   She is at risk for psychosocial distress and has been provided with information to reduce risk.       Annabelle Almanza PA-C on 9/20/2024 at 3:55 PM      Zackary Walker is a 48 year old, presenting for the following:  Physical        9/20/2024     2:47 PM   Additional Questions   Roomed by Huma BAUER        Health Care Directive  Patient does not have a Health Care Directive or Living Will: Discussed advance " care planning with patient; information given to patient to review.    HPI    - mammo - 2016, overdue  - pap due  - colonoscopy hasn;t had     Dermoid cyst of right ovary seen on imaging in 2/2024. Saw OBGYN - felt no intervention needed at that time due to size <5 cm and asymptomatic. Did recommend follow up US in 1 year (2-2025)          9/19/2024   General Health   How would you rate your overall physical health? Good   Feel stress (tense, anxious, or unable to sleep) Only a little      (!) STRESS CONCERN      9/19/2024   Nutrition   Three or more servings of calcium each day? Yes   Diet: Regular (no restrictions)   How many servings of fruit and vegetables per day? (!) 2-3   How many sweetened beverages each day? 0-1            9/19/2024   Exercise   Days per week of moderate/strenous exercise 5 days   Average minutes spent exercising at this level 60 min            9/19/2024   Social Factors   Frequency of gathering with friends or relatives Twice a week   Worry food won't last until get money to buy more No   Food not last or not have enough money for food? No   Do you have housing? (Housing is defined as stable permanent housing and does not include staying ouside in a car, in a tent, in an abandoned building, in an overnight shelter, or couch-surfing.) Yes   Are you worried about losing your housing? No   Lack of transportation? No   Unable to get utilities (heat,electricity)? No            9/19/2024   Dental   Dentist two times every year? Yes            9/19/2024   TB Screening   Were you born outside of the [place]? No          Today's PHQ-2 Score:       5/8/2024     8:21 PM   PHQ-2 ( 1999 [employer])   Q1: Little interest or pleasure in doing things 0   Q2: Feeling down, depressed or hopeless 0   PHQ-2 Score 0   Q1: Little interest or pleasure in doing things Not at all   Q2: Feeling down, depressed or hopeless Not at all   PHQ-2 Score 0         9/19/2024   Substance Use   Alcohol more than 3/day or more than  7/wk No   Do you use any other substances recreationally? No        Social History     Tobacco Use    Smoking status: Never     Passive exposure: Never    Smokeless tobacco: Never   Vaping Use    Vaping status: Never Used   Substance Use Topics    Alcohol use: Yes     Comment: 1x dinner    Drug use: No        Mammogram Screening - Mammogram every 1-2 years updated in Health Maintenance based on mutual decision making          9/19/2024   One time HIV Screening   Previous HIV test? No          9/19/2024   STI Screening   New sexual partner(s) since last STI/HIV test? No        History of abnormal Pap smear: YES - reflected in Problem List and Health Maintenance accordingly    2008: LSIL, 9/08: [place] - MÓNICA 1,2 & 3  10/28/08: LEEP - MÓNICA 2 & 3, clear margins  2/09: NIL pap  10/09: ASCUS, neg HPV  10/10: ASCUS, neg HPV  1/14: NIL pap, neg HPV . Plan cotest pap & HPV in 1 year  5/6/15: Lost to pap tracking  9/9/15: NIL pap, neg HPV. Plan cotest pap & HPV in 3 years.           Latest Ref Rng & Units 9/9/2015     6:18 PM 9/9/2015    12:00 AM 1/10/2014     4:40 PM   PAP / HPV   PAP (Historical)   NIL  NIL    HPV 16 DNA NEG Negative      HPV 18 DNA NEG Negative      Other HR HPV NEG Negative        ASCVD Risk   The ASCVD Risk score (Mia LAINEZ, et al., 2019) failed to calculate for the following reasons:    Cannot find a previous HDL lab    Cannot find a previous total cholesterol lab        9/19/2024   Contraception/Family Planning   Questions about contraception or family planning No      Reviewed and updated as needed this visit by Provider   Tobacco  Allergies  Meds  Problems  Med Hx  Surg Hx  Fam Hx            Past Medical History:   Diagnosis Date    Papanicolaou smear of cervix with low grade squamous intraepithelial lesion (LGSIL) 09/01/2008    positive HPV     Past Surgical History:   Procedure Laterality Date    BREAST BIOPSY, CORE RT/LT Left 08/2016    fibroadenoma    LEEP TX, CERVICAL  10/2008    MÓNICA  "1,2, & 3, clear margins    OPEN REDUCTION INTERNAL FIXATION ANKLE  1990    right         Review of Systems  Constitutional, HEENT, cardiovascular, pulmonary, GI, , musculoskeletal, neuro, skin, endocrine and psych systems are negative, except as otherwise noted.     Objective    Exam  /80 (BP Location: Right arm, Patient Position: Sitting, Cuff Size: Adult Regular)   Pulse 115   Temp 97.9  F (36.6  C) (Tympanic)   Resp 16   Ht 1.54 m (5' 0.63\")   Wt 61.7 kg (136 lb)   LMP 08/30/2024 (Approximate)   SpO2 100%   BMI 26.01 kg/m     Estimated body mass index is 26.01 kg/m  as calculated from the following:    Height as of this encounter: 1.54 m (5' 0.63\").    Weight as of this encounter: 61.7 kg (136 lb).    Physical Exam  GENERAL: alert and no distress  EYES: Eyes grossly normal to inspection, PERRL and conjunctivae and sclerae normal  HENT: ear canals and TM's normal, nose and mouth without ulcers or lesions  NECK: no adenopathy, no asymmetry, masses, or scars  RESP: lungs clear to auscultation - no rales, rhonchi or wheezes  BREAST: normal without masses, tenderness or nipple discharge and no palpable axillary masses or adenopathy  CV: regular rate and rhythm, normal S1 S2, no S3 or S4, no murmur, click or rub, no peripheral edema  ABDOMEN: soft, nontender, no hepatosplenomegaly, no masses and bowel sounds normal   (female) w/bimanual: normal female external genitalia, normal urethral meatus, normal vaginal mucosa, and normal cervix/adnexa/uterus without masses or discharge  MS: no gross musculoskeletal defects noted, no edema  SKIN: no suspicious lesions or rashes  NEURO: Normal strength and tone, mentation intact and speech normal  PSYCH: mentation appears normal, affect normal/bright        Signed Electronically by: Annabelle Almanza PA-C on 9/20/2024 at 3:56 PM    "

## 2024-09-23 LAB
HPV HR 12 DNA CVX QL NAA+PROBE: NEGATIVE
HPV16 DNA CVX QL NAA+PROBE: NEGATIVE
HPV18 DNA CVX QL NAA+PROBE: NEGATIVE
HUMAN PAPILLOMA VIRUS FINAL DIAGNOSIS: NORMAL

## 2024-09-25 LAB
BKR AP ASSOCIATED HPV REPORT: NORMAL
BKR LAB AP GYN ADEQUACY: NORMAL
BKR LAB AP GYN INTERPRETATION: NORMAL
BKR LAB AP LMP: NORMAL
BKR LAB AP PREVIOUS ABNORMAL: NORMAL
PATH REPORT.COMMENTS IMP SPEC: NORMAL
PATH REPORT.COMMENTS IMP SPEC: NORMAL
PATH REPORT.RELEVANT HX SPEC: NORMAL

## 2024-12-30 ENCOUNTER — TELEPHONE (OUTPATIENT)
Dept: GASTROENTEROLOGY | Facility: CLINIC | Age: 49
End: 2024-12-30
Payer: COMMERCIAL

## 2024-12-30 NOTE — TELEPHONE ENCOUNTER
"Endoscopy Scheduling Screen    Have you had any respiratory illness or flu-like symptoms in the last 10 days?  No    What is your communication preference for Instructions and/or Bowel Prep?   MyChart    What insurance is in the chart?  Other:  [employer]    Ordering/Referring Provider:   MARY LOU FRAZIER        (If ordering provider performs procedure, schedule with ordering provider unless otherwise instructed. )    BMI: Estimated body mass index is 26.01 kg/m  as calculated from the following:    Height as of 9/20/24: 1.54 m (5' 0.63\").    Weight as of 9/20/24: 61.7 kg (136 lb).     Sedation Ordered  moderate sedation.   If patient BMI > 50 do not schedule in [facility].    If patient BMI > 45 do not schedule at [facility].    Are you taking methadone or Suboxone?  NO, No RN review required.    Have you been diagnosed and are being treated for severe PTSD or severe anxiety?  NO, No RN review required.    Are you taking any prescription medications for pain 3 or more times per week?   NO, No RN review required.    Do you have a history of malignant hyperthermia?  No    (Females) Are you currently pregnant?   No     Have you been diagnosed or told you have pulmonary hypertension?   No    Do you have an LVAD?  No    Have you been told you have moderate to severe sleep apnea?  No.    Have you been told you have COPD, asthma, or any other lung disease?  No    Do you have any heart conditions?  No     Have you ever had or are you waiting for an organ transplant?  No. Continue scheduling, no site restrictions.    Have you had a stroke or transient ischemic attack (TIA aka \"mini stroke\" in the last 6 months?   No    Have you been diagnosed with or been told you have cirrhosis of the liver?   No.    Are you currently on dialysis?   No    Do you need assistance transferring?   No    BMI: Estimated body mass index is 26.01 kg/m  as calculated from the following:    Height as of 9/20/24: 1.54 m (5' 0.63\").    Weight as of 9/20/24: 61.7 kg (136 " lb).     Is patients BMI > 40 and scheduling location [facility]?  No    Do you take an injectable or oral medication for weight loss or diabetes (excluding insulin)?  No    Do you take the medication Naltrexone?  No    Do you take blood thinners?  No       Prep   Are you currently on dialysis or do you have chronic kidney disease?  No    Do you have a diagnosis of diabetes?  No    Do you have a diagnosis of cystic fibrosis (CF)?  No    On a regular basis do you go 3 -5 days between bowel movements?  No    BMI > 40?  No    Preferred Pharmacy:    [id number] - [place], [place] - [address] AT [facility] [address]  [address]  [place] [address]  Phone: [phone] Fax: [phone]      Final Scheduling Details     Procedure scheduled  Colonoscopy    Surgeon:  AYLA     Date of procedure:  02/03/2025     Pre-OP / PAC:   No - Not required for this site.    Location  [facility] - Patient preference.    Sedation   MAC/Deep Sedation  Per Location      Patient Reminders:   You will receive a call from a Nurse to review instructions and health history.  This assessment must be completed prior to your procedure.  Failure to complete the Nurse assessment may result in the procedure being cancelled.      On the day of your procedure, please designate an adult(s) who can drive you home stay with you for the next 24 hours. The medicines used in the exam will make you sleepy. You will not be able to drive.      You cannot take public transportation, ride share services, or non-medical taxi service without a responsible caregiver.  Medical transport services are allowed with the requirement that a responsible caregiver will receive you at your destination.  We require that drivers and caregivers are confirmed prior to your procedure.

## 2025-01-14 ENCOUNTER — TELEPHONE (OUTPATIENT)
Dept: GASTROENTEROLOGY | Facility: CLINIC | Age: 50
End: 2025-01-14

## 2025-01-14 NOTE — TELEPHONE ENCOUNTER
Bowel Prep Review:  Disclaimer: No call was made to the patient.     Standard Miralax bowel prep.    Recommended due to standard bowel prep.   Instructions were sent via [employer].       Aurora Guzman RN  Endoscopy Procedure Pre Assessment

## 2025-01-17 ENCOUNTER — ANCILLARY PROCEDURE (OUTPATIENT)
Dept: MAMMOGRAPHY | Facility: CLINIC | Age: 50
End: 2025-01-17
Attending: PHYSICIAN ASSISTANT
Payer: COMMERCIAL

## 2025-01-17 DIAGNOSIS — Z12.31 VISIT FOR SCREENING MAMMOGRAM: ICD-10-CM

## 2025-01-17 PROCEDURE — 77067 SCR MAMMO BI INCL CAD: CPT

## 2025-01-17 PROCEDURE — 77063 BREAST TOMOSYNTHESIS BI: CPT

## 2025-01-31 ENCOUNTER — ANESTHESIA EVENT (OUTPATIENT)
Dept: SURGERY | Facility: AMBULATORY SURGERY CENTER | Age: 50
End: 2025-01-31
Payer: COMMERCIAL

## 2025-02-03 ENCOUNTER — ANESTHESIA (OUTPATIENT)
Dept: SURGERY | Facility: AMBULATORY SURGERY CENTER | Age: 50
End: 2025-02-03
Payer: COMMERCIAL

## 2025-02-03 ENCOUNTER — HOSPITAL ENCOUNTER (OUTPATIENT)
Facility: AMBULATORY SURGERY CENTER | Age: 50
Discharge: HOME | End: 2025-02-03
Attending: SURGERY
Payer: COMMERCIAL

## 2025-02-03 VITALS
TEMPERATURE: 97 F | WEIGHT: 130 LBS | HEIGHT: 60 IN | DIASTOLIC BLOOD PRESSURE: 62 MMHG | SYSTOLIC BLOOD PRESSURE: 108 MMHG | OXYGEN SATURATION: 100 % | BODY MASS INDEX: 25.52 KG/M2 | RESPIRATION RATE: 16 BRPM | HEART RATE: 77 BPM

## 2025-02-03 LAB — COLONOSCOPY: NORMAL

## 2025-02-03 RX ORDER — PROPOFOL 10 MG/ML
INJECTION, EMULSION INTRAVENOUS PRN
Status: DISCONTINUED | OUTPATIENT
Start: 2025-02-03 | End: 2025-02-03

## 2025-02-03 RX ORDER — DEXAMETHASONE SODIUM PHOSPHATE 4 MG/ML
4 INJECTION, SOLUTION INTRA-ARTICULAR; INTRALESIONAL; INTRAMUSCULAR; INTRAVENOUS; SOFT TISSUE
Status: DISCONTINUED | OUTPATIENT
Start: 2025-02-03 | End: 2025-02-04 | Stop reason: HOSPADM

## 2025-02-03 RX ORDER — PROPOFOL 10 MG/ML
INJECTION, EMULSION INTRAVENOUS CONTINUOUS PRN
Status: DISCONTINUED | OUTPATIENT
Start: 2025-02-03 | End: 2025-02-03

## 2025-02-03 RX ORDER — NALOXONE HYDROCHLORIDE 0.4 MG/ML
0.1 INJECTION, SOLUTION INTRAMUSCULAR; INTRAVENOUS; SUBCUTANEOUS
Status: DISCONTINUED | OUTPATIENT
Start: 2025-02-03 | End: 2025-02-04 | Stop reason: HOSPADM

## 2025-02-03 RX ORDER — LIDOCAINE HYDROCHLORIDE 20 MG/ML
INJECTION, SOLUTION INFILTRATION; PERINEURAL PRN
Status: DISCONTINUED | OUTPATIENT
Start: 2025-02-03 | End: 2025-02-03

## 2025-02-03 RX ORDER — OXYCODONE HYDROCHLORIDE 5 MG/1
5 TABLET ORAL
Status: DISCONTINUED | OUTPATIENT
Start: 2025-02-03 | End: 2025-02-04 | Stop reason: HOSPADM

## 2025-02-03 RX ORDER — LIDOCAINE 40 MG/G
CREAM TOPICAL
Status: DISCONTINUED | OUTPATIENT
Start: 2025-02-03 | End: 2025-02-04 | Stop reason: HOSPADM

## 2025-02-03 RX ORDER — ONDANSETRON 4 MG/1
4 TABLET, ORALLY DISINTEGRATING ORAL EVERY 30 MIN PRN
Status: DISCONTINUED | OUTPATIENT
Start: 2025-02-03 | End: 2025-02-04 | Stop reason: HOSPADM

## 2025-02-03 RX ORDER — OXYCODONE HYDROCHLORIDE 10 MG/1
10 TABLET ORAL
Status: DISCONTINUED | OUTPATIENT
Start: 2025-02-03 | End: 2025-02-04 | Stop reason: HOSPADM

## 2025-02-03 RX ORDER — SODIUM CHLORIDE, SODIUM LACTATE, POTASSIUM CHLORIDE, CALCIUM CHLORIDE 600; 310; 30; 20 MG/100ML; MG/100ML; MG/100ML; MG/100ML
INJECTION, SOLUTION INTRAVENOUS CONTINUOUS
Status: DISCONTINUED | OUTPATIENT
Start: 2025-02-03 | End: 2025-02-04 | Stop reason: HOSPADM

## 2025-02-03 RX ORDER — ONDANSETRON 2 MG/ML
4 INJECTION INTRAMUSCULAR; INTRAVENOUS EVERY 30 MIN PRN
Status: DISCONTINUED | OUTPATIENT
Start: 2025-02-03 | End: 2025-02-04 | Stop reason: HOSPADM

## 2025-02-03 RX ADMIN — PROPOFOL 200 MCG/KG/MIN: 10 INJECTION, EMULSION INTRAVENOUS at 07:36

## 2025-02-03 RX ADMIN — SODIUM CHLORIDE, SODIUM LACTATE, POTASSIUM CHLORIDE, CALCIUM CHLORIDE: 600; 310; 30; 20 INJECTION, SOLUTION INTRAVENOUS at 07:01

## 2025-02-03 RX ADMIN — LIDOCAINE HYDROCHLORIDE 3 ML: 20 INJECTION, SOLUTION INFILTRATION; PERINEURAL at 07:35

## 2025-02-03 RX ADMIN — PROPOFOL 50 MG: 10 INJECTION, EMULSION INTRAVENOUS at 07:36

## 2025-02-03 NOTE — DISCHARGE INSTRUCTIONS
If you have any questions or concerns regarding your procedure please contact Dr. Guerra, his office number is [phone].    [facility]   Adult Discharge Orders & Instructions     For 24 hours after surgery    Get plenty of rest.  A responsible adult must stay with you for at least 24 hours after you leave the hospital.   Do not drive or use heavy equipment.  If you have weakness or tingling, don't drive or use heavy equipment until this feeling goes away.  Do not drink alcohol.  Avoid strenuous or risky activities.  Ask for help when climbing stairs.   You may feel lightheaded.  IF so, sit for a few minutes before standing.  Have someone help you get up.   If you have nausea (feel sick to your stomach): Drink only clear liquids such as apple juice, ginger ale, broth or 7-Up.  Rest may also help.  Be sure to drink enough fluids.  Move to a regular diet as you feel able.  You may have a slight fever. Call the doctor if your fever is over 100 F (37.7 C) (taken under the tongue) or lasts longer than 24 hours.  You may have a dry mouth, a sore throat, muscle aches or trouble sleeping.  These should go away after 24 hours.  Do not make important or legal decisions.   Call your doctor for any of the followin.  Signs of infection (fever, growing tenderness at the surgery site, a large amount of drainage or bleeding, severe pain, foul-smelling drainage, redness, swelling).    2. It has been over 8 to 10 hours since surgery and you are still not able to urinate (pass water).    3.  Headache for over 24 hours.

## 2025-02-03 NOTE — ANESTHESIA CARE TRANSFER NOTE
Patient: Denise Morales    Procedure: Procedure(s):  COLONOSCOPY       Diagnosis: Screen for colon cancer [Z12.11]  Diagnosis Additional Information: No value filed.    Anesthesia Type:   MAC     Note:    Oropharynx: oropharynx clear of all foreign objects and spontaneously breathing  Level of Consciousness: awake  Oxygen Supplementation: room air    Independent Airway: airway patency satisfactory and stable  Dentition: dentition unchanged  Vital Signs Stable: post-procedure vital signs reviewed and stable  Report to RN Given: handoff report given  Patient transferred to: Phase II    Handoff Report: Identifed the Patient, Identified the Reponsible Provider, Reviewed the pertinent medical history, Discussed the surgical course, Reviewed Intra-OP anesthesia mangement and issues during anesthesia, Set expectations for post-procedure period and Allowed opportunity for questions and acknowledgement of understanding      Vitals:  Vitals Value Taken Time   /62 02/03/25 0757   Temp 97  F (36.1  C) 02/03/25 0757   Pulse 77 02/03/25 0757   Resp 16 02/03/25 0757   SpO2 100 % 02/03/25 0757       Electronically Signed By: MAHENDRA Miles CRNA  February 3, 2025  8:01 AM

## 2025-02-03 NOTE — H&P
PRE PROCEDURE NOTE - H & P      Chief Complaint/Reason for Procedure:              Screening colonoscopy    Current Outpatient Medications   Medication Sig Dispense Refill    Biotin 10 MG CAPS       Collagen Hydrolysate, Bovine, POWD       Multiple Vitamin (DAILY MULTIVITAMIN PO)        Current Facility-Administered Medications   Medication Dose Route Frequency Provider Last Rate Last Admin    lactated ringers infusion   Intravenous Continuous Carla Koch  mL/hr at 02/03/25 0701 New Bag at 02/03/25 0701    lidocaine (LMX4) kit   Topical Q1H PRN Carla Koch MD        lidocaine 1 % 0.1-1 mL  0.1-1 mL Other Q1H PRN Carla Koch MD        sodium chloride (PF) 0.9% PF flush 3 mL  3 mL Intracatheter Q8H Carla Koch MD        sodium chloride (PF) 0.9% PF flush 3 mL  3 mL Intracatheter q1 min prn Carla Koch MD              Allergies   Allergen Reactions    Sulfa Antibiotics Hives       Past Medical History:   Diagnosis Date    Papanicolaou smear of cervix with low grade squamous intraepithelial lesion (LGSIL) 09/01/2008    positive HPV       Past Surgical History:   Procedure Laterality Date    BREAST BIOPSY, CORE RT/LT Left 08/2016    fibroadenoma    LEEP TX, CERVICAL  10/2008    MÓNICA 1,2, & 3, clear margins    OPEN REDUCTION INTERNAL FIXATION ANKLE  1990    right       Pre-sedation assessment:            /80   Pulse 91   Temp 97.7  F (36.5  C) (Temporal)   Resp 16   Ht 1.524 m (5')   Wt 59 kg (130 lb)   LMP 12/26/2024   SpO2 98%   BMI 25.39 kg/m    General appearance: alert, appears stated age, and cooperative  Airway: No gross abnormalities appreciated that would increase risk of airway compromise.   Heart: Regular rate and rhythm  Lungs: Normal respiratory effort    ASA Classification: 1    Sedation Plan based on assessment: Moderate    Impression:  Patient deemed adequate candidate for moderate sedation         Plan:   colonoscopy      Chaitanya Guerra  MD  2/3/2025 7:30 AM  [phone]

## 2025-02-03 NOTE — ANESTHESIA PREPROCEDURE EVALUATION
"Anesthesia Pre-Procedure Evaluation    Patient: Denise Morales   MRN: [id number] : 1975        Procedure : Procedure(s):  COLONOSCOPY          Past Medical History:   Diagnosis Date     Papanicolaou smear of cervix with low grade squamous intraepithelial lesion (LGSIL) 2008    positive HPV      Past Surgical History:   Procedure Laterality Date     BREAST BIOPSY, CORE RT/LT Left 2016    fibroadenoma     LEEP TX, CERVICAL  10/2008    MÓNICA 1,2, & 3, clear margins     OPEN REDUCTION INTERNAL FIXATION ANKLE      right      Allergies   Allergen Reactions     Sulfa Antibiotics Hives      Social History     Tobacco Use     Smoking status: Never     Passive exposure: Never     Smokeless tobacco: Never   Substance Use Topics     Alcohol use: Yes     Comment: 1x dinner      Wt Readings from Last 1 Encounters:   25 59 kg (130 lb)        Anesthesia Evaluation            ROS/MED HX  ENT/Pulmonary:  - neg pulmonary ROS     Neurologic:  - neg neurologic ROS     Cardiovascular:  - neg cardiovascular ROS     METS/Exercise Tolerance:     Hematologic:  - neg hematologic  ROS     Musculoskeletal:  - neg musculoskeletal ROS     GI/Hepatic:  - neg GI/hepatic ROS     Renal/Genitourinary:  - neg Renal ROS     Endo:  - neg endo ROS     Psychiatric/Substance Use:  - neg psychiatric ROS     Infectious Disease:  - neg infectious disease ROS     Malignancy:  - neg malignancy ROS     Other:  - neg other ROS        Physical Exam    Airway  airway exam normal      Mallampati: II       Respiratory Devices and Support         Dental  no notable dental history         Cardiovascular   cardiovascular exam normal       Rhythm and rate: regular and normal     Pulmonary   pulmonary exam normal        breath sounds clear to auscultation       OUTSIDE LABS:  CBC: No results found for: \"WBC\", \"HGB\", \"HCT\", \"PLT\"  BMP:   Lab Results   Component Value Date    GLC 90 2015     COAGS: No results found for: \"PTT\", \"INR\", " "\"FIBR\"  POC:   Lab Results   Component Value Date    HCG Negative 10/28/2008     HEPATIC: No results found for: \"ALBUMIN\", \"PROTTOTAL\", \"ALT\", \"AST\", \"GGT\", \"ALKPHOS\", \"BILITOTAL\", \"BILIDIRECT\", \"KIMBERLEE\"  OTHER:   Lab Results   Component Value Date    TSH 2.18 09/09/2015       Anesthesia Plan    ASA Status:  1       Anesthesia Type: MAC.   Induction: Intravenous, Propofol.   Maintenance: TIVA.        Consents    Anesthesia Plan(s) and associated risks, benefits, and realistic alternatives discussed. Questions answered and patient/representative(s) expressed understanding.     - Discussed:     - Discussed with:  Patient      - Extended Intubation/Ventilatory Support Discussed: No.      - Patient is DNR/DNI Status: No     Use of blood products discussed: No .     Postoperative Care    Pain management: IV analgesics.   PONV prophylaxis: Ondansetron (or other 5HT-3), Dexamethasone or Solumedrol     Comments:    Other Comments: Propofol infusion  TIVA         Abdirahman Najera MD    I have reviewed the pertinent notes and labs in the chart from the past 30 days and (re)examined the patient.  Any updates or changes from those notes are reflected in this note.    Clinically Significant Risk Factors Present on Admission                             # Overweight: Estimated body mass index is 25.39 kg/m  as calculated from the following:    Height as of this encounter: 1.524 m (5').    Weight as of this encounter: 59 kg (130 lb).                "

## 2025-02-03 NOTE — PROGRESS NOTES
Patient offered pregnancy test prior to procedure. Patient denies possibility of pregnancy. Patient declines pregnancy test at this time.

## 2025-02-03 NOTE — ANESTHESIA POSTPROCEDURE EVALUATION
Patient: Denise Morales    Procedure: Procedure(s):  COLONOSCOPY       Anesthesia Type:  MAC    Note:  Disposition: Outpatient   Postop Pain Control: Uneventful            Sign Out: Well controlled pain   PONV: No   Neuro/Psych: Uneventful            Sign Out: Acceptable/Baseline neuro status   Airway/Respiratory: Uneventful            Sign Out: Acceptable/Baseline resp. status   CV/Hemodynamics: Uneventful            Sign Out: Acceptable CV status; No obvious hypovolemia; No obvious fluid overload   Other NRE:    DID A NON-ROUTINE EVENT OCCUR?        Last vitals:  Vitals Value Taken Time   /62 02/03/25 0757   Temp 97  F (36.1  C) 02/03/25 0757   Pulse 70 02/03/25 0822   Resp 16 02/03/25 0757   SpO2 100 % 02/03/25 0822   Vitals shown include unfiled device data.    Electronically Signed By: Abdirahman Najera MD  February 3, 2025  10:43 AM

## 2025-07-24 ENCOUNTER — TRANSFERRED RECORDS (OUTPATIENT)
Dept: HEALTH INFORMATION MANAGEMENT | Facility: CLINIC | Age: 50
End: 2025-07-24
Payer: COMMERCIAL

## 2025-08-20 ENCOUNTER — TRANSFERRED RECORDS (OUTPATIENT)
Dept: HEALTH INFORMATION MANAGEMENT | Facility: CLINIC | Age: 50
End: 2025-08-20
Payer: COMMERCIAL

## 2025-08-21 ENCOUNTER — PATIENT OUTREACH (OUTPATIENT)
Dept: CARE COORDINATION | Facility: CLINIC | Age: 50
End: 2025-08-21
Payer: COMMERCIAL